# Patient Record
(demographics unavailable — no encounter records)

---

## 2024-10-30 NOTE — PHYSICAL EXAM
[Moderate] : moderate swelling of toe(s) [5th] : 5th [5___] : plantar flexion 5[unfilled]/5 [2+] : posterior tibialis pulse: 2+ [] : patient ambulates without assistive device [Outside films reviewed] : Outside films reviewed [Right] : right toe [de-identified] : healing hairline fracture of proximal phalanx

## 2024-10-30 NOTE — HISTORY OF PRESENT ILLNESS
[de-identified] : Patient presents for RT pinky toe pain evaluation. Patient states he stubbed his RT pinky toe about 2 weeks ago on concrete table. Patient states he had swelling and bruising on his toe. Patient went to his PCP who ordered x-rays 2 days later and was told no fracture and was prescribed tramadol for a week. Patient states the pain has gotten worse since 2 weeks ago. Patient states since he is changing his gait, his knee and thigh are also hurting.

## 2024-10-30 NOTE — DISCUSSION/SUMMARY
[de-identified] : I reviewed patient's radiographs and discussed his condition and treatment options.  I advised immobilization in hard sole shoe and resting from running for 1-2 weeks.  Follow up as needed.  Patient voiced understanding and agreement with the plan.